# Patient Record
Sex: MALE | Race: WHITE | NOT HISPANIC OR LATINO | Employment: OTHER | URBAN - METROPOLITAN AREA
[De-identification: names, ages, dates, MRNs, and addresses within clinical notes are randomized per-mention and may not be internally consistent; named-entity substitution may affect disease eponyms.]

---

## 2017-06-20 ENCOUNTER — OFFICE VISIT (OUTPATIENT)
Dept: FAMILY MEDICINE CLINIC | Facility: CLINIC | Age: 67
End: 2017-06-20

## 2017-06-20 VITALS
SYSTOLIC BLOOD PRESSURE: 132 MMHG | OXYGEN SATURATION: 98 % | TEMPERATURE: 97.6 F | BODY MASS INDEX: 28.17 KG/M2 | WEIGHT: 208 LBS | HEIGHT: 72 IN | HEART RATE: 68 BPM | DIASTOLIC BLOOD PRESSURE: 80 MMHG

## 2017-06-20 DIAGNOSIS — I10 HTN (HYPERTENSION), BENIGN: Primary | ICD-10-CM

## 2017-06-20 DIAGNOSIS — N40.1 BPH (BENIGN PROSTATIC HYPERTROPHY) WITH URINARY OBSTRUCTION: ICD-10-CM

## 2017-06-20 DIAGNOSIS — E55.9 VITAMIN D DEFICIENCY: ICD-10-CM

## 2017-06-20 DIAGNOSIS — E78.5 HYPERLIPIDEMIA, UNSPECIFIED HYPERLIPIDEMIA TYPE: ICD-10-CM

## 2017-06-20 DIAGNOSIS — N13.8 BPH (BENIGN PROSTATIC HYPERTROPHY) WITH URINARY OBSTRUCTION: ICD-10-CM

## 2017-06-20 DIAGNOSIS — F51.04 PSYCHOPHYSIOLOGICAL INSOMNIA: ICD-10-CM

## 2017-06-20 DIAGNOSIS — F41.9 ANXIETY: ICD-10-CM

## 2017-06-20 PROCEDURE — 99213 OFFICE O/P EST LOW 20 MIN: CPT | Performed by: FAMILY MEDICINE

## 2017-06-20 RX ORDER — TADALAFIL 5 MG/1
5 TABLET ORAL DAILY PRN
Qty: 90 TABLET | Refills: 1 | Status: SHIPPED | OUTPATIENT
Start: 2017-06-20 | End: 2018-12-13

## 2017-06-20 RX ORDER — CANDESARTAN 32 MG/1
32 TABLET ORAL DAILY
Qty: 30 TABLET | Refills: 2 | Status: SHIPPED | OUTPATIENT
Start: 2017-06-20 | End: 2017-06-20 | Stop reason: SDUPTHER

## 2017-06-20 RX ORDER — ALPRAZOLAM 0.5 MG/1
TABLET ORAL
Qty: 30 TABLET | Refills: 0 | Status: SHIPPED | OUTPATIENT
Start: 2017-06-20 | End: 2017-09-18 | Stop reason: SDUPTHER

## 2017-06-20 RX ORDER — CANDESARTAN 32 MG/1
32 TABLET ORAL DAILY
Qty: 30 TABLET | Refills: 2 | Status: SHIPPED | OUTPATIENT
Start: 2017-06-20 | End: 2017-07-16 | Stop reason: SDUPTHER

## 2017-06-21 NOTE — PATIENT INSTRUCTIONS
Belsomra will be initiated at 15 mg daily at bedtime on an as-needed basis for sleep.  The number of 10 will be given with a free coupon which will allow him to receive another 10 of 20 mg if so needed.  Other medications will be continued without alteration.  Follow-up is anticipated in 6 months preceded by fasting laboratory studies.

## 2017-07-03 RX ORDER — TEMAZEPAM 30 MG/1
30 CAPSULE ORAL NIGHTLY PRN
Qty: 30 CAPSULE | Refills: 1 | Status: SHIPPED | OUTPATIENT
Start: 2017-07-03 | End: 2018-05-05

## 2017-07-17 RX ORDER — CANDESARTAN 32 MG/1
TABLET ORAL
Qty: 30 TABLET | Refills: 0 | Status: SHIPPED | OUTPATIENT
Start: 2017-07-17 | End: 2017-10-07 | Stop reason: SDUPTHER

## 2017-09-19 RX ORDER — ALPRAZOLAM 0.5 MG/1
TABLET ORAL
Qty: 30 TABLET | Refills: 2 | Status: SHIPPED | OUTPATIENT
Start: 2017-09-19 | End: 2018-08-17 | Stop reason: SDUPTHER

## 2017-10-09 RX ORDER — CANDESARTAN 32 MG/1
TABLET ORAL
Qty: 30 TABLET | Refills: 2 | Status: SHIPPED | OUTPATIENT
Start: 2017-10-09 | End: 2018-05-05 | Stop reason: SDUPTHER

## 2018-04-19 RX ORDER — ALPRAZOLAM 0.5 MG/1
TABLET ORAL
Qty: 30 TABLET | Refills: 2 | OUTPATIENT
Start: 2018-04-19

## 2018-05-02 ENCOUNTER — OFFICE VISIT (OUTPATIENT)
Dept: FAMILY MEDICINE CLINIC | Facility: CLINIC | Age: 68
End: 2018-05-02

## 2018-05-02 VITALS
RESPIRATION RATE: 16 BRPM | HEART RATE: 92 BPM | WEIGHT: 207.9 LBS | DIASTOLIC BLOOD PRESSURE: 74 MMHG | OXYGEN SATURATION: 94 % | SYSTOLIC BLOOD PRESSURE: 118 MMHG | TEMPERATURE: 98.2 F | HEIGHT: 72 IN | BODY MASS INDEX: 28.16 KG/M2

## 2018-05-02 DIAGNOSIS — E78.2 MIXED HYPERLIPIDEMIA: ICD-10-CM

## 2018-05-02 DIAGNOSIS — F51.04 PSYCHOPHYSIOLOGICAL INSOMNIA: ICD-10-CM

## 2018-05-02 DIAGNOSIS — I10 HTN (HYPERTENSION), BENIGN: Primary | ICD-10-CM

## 2018-05-02 DIAGNOSIS — N40.1 BENIGN PROSTATIC HYPERPLASIA WITH URINARY OBSTRUCTION: ICD-10-CM

## 2018-05-02 DIAGNOSIS — E55.9 VITAMIN D DEFICIENCY: ICD-10-CM

## 2018-05-02 DIAGNOSIS — F41.9 ANXIETY: ICD-10-CM

## 2018-05-02 DIAGNOSIS — N13.8 BENIGN PROSTATIC HYPERPLASIA WITH URINARY OBSTRUCTION: ICD-10-CM

## 2018-05-02 PROCEDURE — 99213 OFFICE O/P EST LOW 20 MIN: CPT | Performed by: FAMILY MEDICINE

## 2018-05-05 RX ORDER — CANDESARTAN 32 MG/1
32 TABLET ORAL DAILY
Qty: 90 TABLET | Refills: 0 | Status: SHIPPED | OUTPATIENT
Start: 2018-05-05 | End: 2018-05-07 | Stop reason: SDUPTHER

## 2018-05-05 NOTE — PROGRESS NOTES
Subjective   Jay Jay Schreiber is a 67 y.o. male with   Chief Complaint   Patient presents with   • Med Refill   .    History of Present Illness   67-year-old white male with history of hypertension using candesartan at 32 mg-half tab daily.  This has controlled his blood pressure well for years and he will need refill of same.  Patient continues to use alprazolam using anywhere from 1-1-1/2 daily-usually at night near bedtime.  He no longer uses temazepam and at one time had used Ambien but found himself sleepwalking.  Last labs have been quite some time ago and patient is currently not fasting.  He is tolerating the above medication and there are no acute complaints.  The following portions of the patient's history were reviewed and updated as appropriate: allergies, current medications, past family history, past medical history, past social history, past surgical history and problem list.    Review of Systems   Cardiovascular: Negative for chest pain, palpitations and leg swelling.        Essential hypertension   Psychiatric/Behavioral: Positive for sleep disturbance. Negative for dysphoric mood. The patient is nervous/anxious.        Objective     Vitals:    05/02/18 1605   BP: 118/74   Pulse: 92   Resp: 16   Temp: 98.2 °F (36.8 °C)   SpO2: 94%       No results found for this or any previous visit (from the past 168 hour(s)).    Physical Exam   Constitutional: He is oriented to person, place, and time. He appears well-developed and well-nourished.   HENT:   Head: Normocephalic and atraumatic.   Neck: Trachea normal and phonation normal. Neck supple. Normal carotid pulses present. Carotid bruit is not present. No thyroid mass and no thyromegaly present.   Cardiovascular: Normal rate, regular rhythm and normal heart sounds.  Exam reveals no gallop and no friction rub.    No murmur heard.  Pulmonary/Chest: Effort normal and breath sounds normal. No respiratory distress. He has no decreased breath sounds. He has no  wheezes. He has no rhonchi. He has no rales.   Lymphadenopathy:     He has no cervical adenopathy.   Neurological: He is alert and oriented to person, place, and time.   Skin: Skin is warm and dry. No rash noted.   Psychiatric: He has a normal mood and affect. His speech is normal and behavior is normal. Judgment and thought content normal. Cognition and memory are normal.   Nursing note and vitals reviewed.      Assessment/Plan   Jay Jay was seen today for med refill.    Diagnoses and all orders for this visit:    HTN (hypertension), benign  -     CBC & Differential; Future  -     Comprehensive Metabolic Panel; Future    Mixed hyperlipidemia  -     CBC & Differential; Future  -     Comprehensive Metabolic Panel; Future  -     Lipid Panel; Future    Vitamin D deficiency  -     CBC & Differential; Future  -     Comprehensive Metabolic Panel; Future  -     Vitamin D 25 Hydroxy; Future    Anxiety  -     CBC & Differential; Future  -     Comprehensive Metabolic Panel; Future    Psychophysiological insomnia  -     CBC & Differential; Future  -     Comprehensive Metabolic Panel; Future    BPH (benign prostatic hypertrophy) with urinary obstruction  -     CBC & Differential; Future  -     Comprehensive Metabolic Panel; Future  -     PSA DIAGNOSTIC; Future    Other orders  -     candesartan (ATACAND) 32 MG tablet; Take 1 tablet by mouth Daily.        Return in about 6 months (around 11/2/2018) for Recheck.

## 2018-05-07 RX ORDER — CANDESARTAN 32 MG/1
32 TABLET ORAL DAILY
Qty: 90 TABLET | Refills: 1 | Status: SHIPPED | OUTPATIENT
Start: 2018-05-07 | End: 2018-06-27 | Stop reason: SDUPTHER

## 2018-05-07 RX ORDER — CANDESARTAN 32 MG/1
TABLET ORAL
Qty: 30 TABLET | Refills: 2 | Status: SHIPPED | OUTPATIENT
Start: 2018-05-07 | End: 2018-05-07 | Stop reason: SDUPTHER

## 2018-06-27 RX ORDER — CANDESARTAN 32 MG/1
32 TABLET ORAL DAILY
Qty: 30 TABLET | Refills: 6 | Status: SHIPPED | OUTPATIENT
Start: 2018-06-27 | End: 2018-08-23 | Stop reason: SDUPTHER

## 2018-08-20 RX ORDER — ALPRAZOLAM 0.5 MG/1
TABLET ORAL
Qty: 30 TABLET | Refills: 0 | Status: SHIPPED | OUTPATIENT
Start: 2018-08-20 | End: 2018-11-21 | Stop reason: SDUPTHER

## 2018-08-23 RX ORDER — CANDESARTAN 32 MG/1
32 TABLET ORAL DAILY
Qty: 90 TABLET | Refills: 1 | Status: SHIPPED | OUTPATIENT
Start: 2018-08-23 | End: 2019-08-30 | Stop reason: SDUPTHER

## 2018-10-15 DIAGNOSIS — N13.8 BENIGN PROSTATIC HYPERPLASIA WITH URINARY OBSTRUCTION: ICD-10-CM

## 2018-10-15 DIAGNOSIS — F51.04 PSYCHOPHYSIOLOGICAL INSOMNIA: ICD-10-CM

## 2018-10-15 DIAGNOSIS — F41.9 ANXIETY: ICD-10-CM

## 2018-10-15 DIAGNOSIS — I10 HTN (HYPERTENSION), BENIGN: ICD-10-CM

## 2018-10-15 DIAGNOSIS — E55.9 VITAMIN D DEFICIENCY: ICD-10-CM

## 2018-10-15 DIAGNOSIS — E78.2 MIXED HYPERLIPIDEMIA: ICD-10-CM

## 2018-10-15 DIAGNOSIS — N40.1 BENIGN PROSTATIC HYPERPLASIA WITH URINARY OBSTRUCTION: ICD-10-CM

## 2018-11-21 RX ORDER — ALPRAZOLAM 0.5 MG/1
TABLET ORAL
Qty: 30 TABLET | Refills: 0 | Status: SHIPPED | OUTPATIENT
Start: 2018-11-21 | End: 2019-03-04 | Stop reason: SDUPTHER

## 2018-12-06 LAB
25(OH)D3+25(OH)D2 SERPL-MCNC: 38.4 NG/ML
ALBUMIN SERPL-MCNC: 4.8 G/DL (ref 3.5–5.2)
ALBUMIN/GLOB SERPL: 2.2 G/DL
ALP SERPL-CCNC: 59 U/L (ref 40–129)
ALT SERPL-CCNC: 24 U/L (ref 5–41)
AST SERPL-CCNC: 19 U/L (ref 5–40)
BASOPHILS # BLD AUTO: 0.02 10*3/MM3 (ref 0–0.2)
BASOPHILS NFR BLD AUTO: 0.4 % (ref 0–2)
BILIRUB SERPL-MCNC: 0.6 MG/DL (ref 0.2–1.2)
BUN SERPL-MCNC: 12 MG/DL (ref 8–23)
BUN/CREAT SERPL: 14 (ref 7–25)
CALCIUM SERPL-MCNC: 9.3 MG/DL (ref 8.8–10.5)
CHLORIDE SERPL-SCNC: 101 MMOL/L (ref 98–107)
CHOLEST SERPL-MCNC: 264 MG/DL (ref 0–200)
CO2 SERPL-SCNC: 25.1 MMOL/L (ref 22–29)
CREAT SERPL-MCNC: 0.86 MG/DL (ref 0.76–1.27)
EOSINOPHIL # BLD AUTO: 0.17 10*3/MM3 (ref 0.1–0.3)
EOSINOPHIL NFR BLD AUTO: 3.7 % (ref 0–4)
ERYTHROCYTE [DISTWIDTH] IN BLOOD BY AUTOMATED COUNT: 13.1 % (ref 11.5–14.5)
GLOBULIN SER CALC-MCNC: 2.2 GM/DL
GLUCOSE SERPL-MCNC: 95 MG/DL (ref 65–99)
HCT VFR BLD AUTO: 45.7 % (ref 42–52)
HDLC SERPL-MCNC: 43 MG/DL (ref 40–60)
HGB BLD-MCNC: 15 G/DL (ref 14–18)
IMM GRANULOCYTES # BLD: 0.01 10*3/MM3 (ref 0–0.03)
IMM GRANULOCYTES NFR BLD: 0.2 % (ref 0–0.5)
LDLC SERPL CALC-MCNC: 188 MG/DL (ref 0–100)
LYMPHOCYTES # BLD AUTO: 1.2 10*3/MM3 (ref 0.6–4.8)
LYMPHOCYTES NFR BLD AUTO: 26.4 % (ref 20–45)
MCH RBC QN AUTO: 30.9 PG (ref 27–31)
MCHC RBC AUTO-ENTMCNC: 32.8 G/DL (ref 31–37)
MCV RBC AUTO: 94.2 FL (ref 80–94)
MONOCYTES # BLD AUTO: 0.46 10*3/MM3 (ref 0–1)
MONOCYTES NFR BLD AUTO: 10.1 % (ref 3–8)
NEUTROPHILS # BLD AUTO: 2.68 10*3/MM3 (ref 1.5–8.3)
NEUTROPHILS NFR BLD AUTO: 59.2 % (ref 45–70)
NRBC BLD AUTO-RTO: 0 /100 WBC (ref 0–0)
PLATELET # BLD AUTO: 227 10*3/MM3 (ref 140–500)
POTASSIUM SERPL-SCNC: 4.5 MMOL/L (ref 3.5–5.2)
PROT SERPL-MCNC: 7 G/DL (ref 6–8.5)
PSA SERPL-MCNC: 3.22 NG/ML (ref 0–4)
RBC # BLD AUTO: 4.85 10*6/MM3 (ref 4.7–6.1)
SODIUM SERPL-SCNC: 139 MMOL/L (ref 136–145)
TRIGL SERPL-MCNC: 167 MG/DL (ref 0–150)
VLDLC SERPL CALC-MCNC: 33.4 MG/DL (ref 8–32)
WBC # BLD AUTO: 4.54 10*3/MM3 (ref 4.8–10.8)

## 2018-12-13 ENCOUNTER — OFFICE VISIT (OUTPATIENT)
Dept: FAMILY MEDICINE CLINIC | Facility: CLINIC | Age: 68
End: 2018-12-13

## 2018-12-13 VITALS
TEMPERATURE: 98.2 F | BODY MASS INDEX: 27.63 KG/M2 | WEIGHT: 204 LBS | DIASTOLIC BLOOD PRESSURE: 80 MMHG | HEIGHT: 72 IN | RESPIRATION RATE: 16 BRPM | SYSTOLIC BLOOD PRESSURE: 134 MMHG | OXYGEN SATURATION: 99 % | HEART RATE: 78 BPM

## 2018-12-13 DIAGNOSIS — E78.2 MIXED HYPERLIPIDEMIA: ICD-10-CM

## 2018-12-13 DIAGNOSIS — F41.9 ANXIETY: ICD-10-CM

## 2018-12-13 DIAGNOSIS — E55.9 VITAMIN D DEFICIENCY: ICD-10-CM

## 2018-12-13 DIAGNOSIS — F51.04 PSYCHOPHYSIOLOGICAL INSOMNIA: ICD-10-CM

## 2018-12-13 DIAGNOSIS — I10 HTN (HYPERTENSION), BENIGN: Primary | ICD-10-CM

## 2018-12-13 PROCEDURE — 99213 OFFICE O/P EST LOW 20 MIN: CPT | Performed by: FAMILY MEDICINE

## 2018-12-13 NOTE — PATIENT INSTRUCTIONS
Orders Only on 10/15/2018   Component Date Value Ref Range Status   • WBC 12/06/2018 4.54* 4.80 - 10.80 10*3/mm3 Final   • RBC 12/06/2018 4.85  4.70 - 6.10 10*6/mm3 Final   • Hemoglobin 12/06/2018 15.0  14.0 - 18.0 g/dL Final   • Hematocrit 12/06/2018 45.7  42.0 - 52.0 % Final   • MCV 12/06/2018 94.2* 80.0 - 94.0 fL Final   • MCH 12/06/2018 30.9  27.0 - 31.0 pg Final   • MCHC 12/06/2018 32.8  31.0 - 37.0 g/dL Final   • RDW 12/06/2018 13.1  11.5 - 14.5 % Final   • Platelets 12/06/2018 227  140 - 500 10*3/mm3 Final   • Neutrophil Rel % 12/06/2018 59.2  45.0 - 70.0 % Final   • Lymphocyte Rel % 12/06/2018 26.4  20.0 - 45.0 % Final   • Monocyte Rel % 12/06/2018 10.1* 3.0 - 8.0 % Final   • Eosinophil Rel % 12/06/2018 3.7  0.0 - 4.0 % Final   • Basophil Rel % 12/06/2018 0.4  0.0 - 2.0 % Final   • Neutrophils Absolute 12/06/2018 2.68  1.50 - 8.30 10*3/mm3 Final   • Lymphocytes Absolute 12/06/2018 1.20  0.60 - 4.80 10*3/mm3 Final   • Monocytes Absolute 12/06/2018 0.46  0.00 - 1.00 10*3/mm3 Final   • Eosinophils Absolute 12/06/2018 0.17  0.10 - 0.30 10*3/mm3 Final   • Basophils Absolute 12/06/2018 0.02  0.00 - 0.20 10*3/mm3 Final   • Immature Granulocyte Rel % 12/06/2018 0.2  0.0 - 0.5 % Final   • Immature Grans Absolute 12/06/2018 0.01  0.00 - 0.03 10*3/mm3 Final   • nRBC 12/06/2018 0.0  0.0 - 0.0 /100 WBC Final   • Glucose 12/06/2018 95  65 - 99 mg/dL Final   • BUN 12/06/2018 12  8 - 23 mg/dL Final   • Creatinine 12/06/2018 0.86  0.76 - 1.27 mg/dL Final   • eGFR Non  Am 12/06/2018 88  >60 mL/min/1.73 Final   • eGFR African Am 12/06/2018 107  >60 mL/min/1.73 Final   • BUN/Creatinine Ratio 12/06/2018 14.0  7.0 - 25.0 Final   • Sodium 12/06/2018 139  136 - 145 mmol/L Final   • Potassium 12/06/2018 4.5  3.5 - 5.2 mmol/L Final   • Chloride 12/06/2018 101  98 - 107 mmol/L Final   • Total CO2 12/06/2018 25.1  22.0 - 29.0 mmol/L Final   • Calcium 12/06/2018 9.3  8.8 - 10.5 mg/dL Final   • Total Protein 12/06/2018 7.0  6.0 -  "8.5 g/dL Final   • Albumin 12/06/2018 4.80  3.50 - 5.20 g/dL Final   • Globulin 12/06/2018 2.2  gm/dL Final   • A/G Ratio 12/06/2018 2.2  g/dL Final   • Total Bilirubin 12/06/2018 0.6  0.2 - 1.2 mg/dL Final   • Alkaline Phosphatase 12/06/2018 59  40 - 129 U/L Final   • AST (SGOT) 12/06/2018 19  5 - 40 U/L Final   • ALT (SGPT) 12/06/2018 24  5 - 41 U/L Final   • Total Cholesterol 12/06/2018 264* 0 - 200 mg/dL Final   • Triglycerides 12/06/2018 167* 0 - 150 mg/dL Final   • HDL Cholesterol 12/06/2018 43  40 - 60 mg/dL Final   • VLDL Cholesterol 12/06/2018 33.4* 8 - 32 mg/dL Final   • LDL Cholesterol  12/06/2018 188* 0 - 100 mg/dL Final   • PSA 12/06/2018 3.220  0.000 - 4.000 ng/mL Final    Comment: The total PSA (tPSA) method performed at [facility] is a  quantitative electrochemiluminescence immunoassay 'ECLIA'     • 25 Hydroxy, Vitamin D 12/06/2018 38.4  ng/ml Final    Comment: Reference Range for Total Vitamin D 25(OH)  Deficiency <20.0 ng/mL  Insufficiency 21-29 ng/mL  Sufficiency  ng/mL  Toxicity >100 ng/mL         Slo-Release Niacin taking 500 mg with Aspirin 325 mg for 1 month.  Then 1000 mg of Slo-Release Niacin with Apirin 325 mg for 1 month then take 1500 mg of Slo-Release Niacin with the Aspirin 325 mg for 1 month.  Then return for a follow up on your Cholesterol.  At this point you can start taking the Niacin at any point during the day and may stop the Aspirin.  If you should have flushing you can go back to taking the Aspirin with it.  Do not use regular Niacin or the \"no flush\" Niacin.  Consider starting Vitamin D3 5000 IU daily.  "

## 2018-12-13 NOTE — PROGRESS NOTES
Subjective   Jay Jay Schreiber is a 68 y.o. male with   Chief Complaint   Patient presents with   • Follow-up     on labs   • Hypertension   .    History of Present Illness     67 yo white male who presents for follow up on stable HTN, unstable HLD, stable Vit D Def.  He does complain of some slight issues with urination, just that it is a little more difficult.  He is currently prescribed Atacand 32 mg, taking a 1/2 tablet daily for his HTN and he tolerates it well.  He also has a history of Anxiety and he is prescribed Xanax 0.5 mg that he takes once daily, typically at bedtime and it works well for him.  He has tried Temazepam and Ambien in the past.  But he had problems with those.  Pt states he is doing well at this time and he is without acute complaint.    The following portions of the patient's history were reviewed and updated as appropriate: allergies, current medications, past family history, past medical history, past social history, past surgical history and problem list.    Review of Systems   Cardiovascular: Negative for chest pain, palpitations and leg swelling.        HTN  HLD   Endocrine: Negative for cold intolerance and heat intolerance.        Vit D Def   Psychiatric/Behavioral: Positive for sleep disturbance. The patient is nervous/anxious.    All other systems reviewed and are negative.      Objective     Vitals:    12/13/18 1431   BP: 134/80   Pulse: 78   Resp: 16   Temp: 98.2 °F (36.8 °C)   SpO2: 99%     BP Readings from Last 3 Encounters:   12/13/18 134/80   05/02/18 118/74   06/20/17 132/80      Wt Readings from Last 3 Encounters:   12/13/18 92.5 kg (204 lb)   05/02/18 94.3 kg (207 lb 14.4 oz)   06/20/17 94.3 kg (208 lb)        No visits with results within 2 Week(s) from this visit.   Latest known visit with results is:   Orders Only on 10/15/2018   Component Date Value Ref Range Status   • WBC 12/06/2018 4.54* 4.80 - 10.80 10*3/mm3 Final   • RBC 12/06/2018 4.85  4.70 - 6.10 10*6/mm3 Final    • Hemoglobin 12/06/2018 15.0  14.0 - 18.0 g/dL Final   • Hematocrit 12/06/2018 45.7  42.0 - 52.0 % Final   • MCV 12/06/2018 94.2* 80.0 - 94.0 fL Final   • MCH 12/06/2018 30.9  27.0 - 31.0 pg Final   • MCHC 12/06/2018 32.8  31.0 - 37.0 g/dL Final   • RDW 12/06/2018 13.1  11.5 - 14.5 % Final   • Platelets 12/06/2018 227  140 - 500 10*3/mm3 Final   • Neutrophil Rel % 12/06/2018 59.2  45.0 - 70.0 % Final   • Lymphocyte Rel % 12/06/2018 26.4  20.0 - 45.0 % Final   • Monocyte Rel % 12/06/2018 10.1* 3.0 - 8.0 % Final   • Eosinophil Rel % 12/06/2018 3.7  0.0 - 4.0 % Final   • Basophil Rel % 12/06/2018 0.4  0.0 - 2.0 % Final   • Neutrophils Absolute 12/06/2018 2.68  1.50 - 8.30 10*3/mm3 Final   • Lymphocytes Absolute 12/06/2018 1.20  0.60 - 4.80 10*3/mm3 Final   • Monocytes Absolute 12/06/2018 0.46  0.00 - 1.00 10*3/mm3 Final   • Eosinophils Absolute 12/06/2018 0.17  0.10 - 0.30 10*3/mm3 Final   • Basophils Absolute 12/06/2018 0.02  0.00 - 0.20 10*3/mm3 Final   • Immature Granulocyte Rel % 12/06/2018 0.2  0.0 - 0.5 % Final   • Immature Grans Absolute 12/06/2018 0.01  0.00 - 0.03 10*3/mm3 Final   • nRBC 12/06/2018 0.0  0.0 - 0.0 /100 WBC Final   • Glucose 12/06/2018 95  65 - 99 mg/dL Final   • BUN 12/06/2018 12  8 - 23 mg/dL Final   • Creatinine 12/06/2018 0.86  0.76 - 1.27 mg/dL Final   • eGFR Non  Am 12/06/2018 88  >60 mL/min/1.73 Final   • eGFR African Am 12/06/2018 107  >60 mL/min/1.73 Final   • BUN/Creatinine Ratio 12/06/2018 14.0  7.0 - 25.0 Final   • Sodium 12/06/2018 139  136 - 145 mmol/L Final   • Potassium 12/06/2018 4.5  3.5 - 5.2 mmol/L Final   • Chloride 12/06/2018 101  98 - 107 mmol/L Final   • Total CO2 12/06/2018 25.1  22.0 - 29.0 mmol/L Final   • Calcium 12/06/2018 9.3  8.8 - 10.5 mg/dL Final   • Total Protein 12/06/2018 7.0  6.0 - 8.5 g/dL Final   • Albumin 12/06/2018 4.80  3.50 - 5.20 g/dL Final   • Globulin 12/06/2018 2.2  gm/dL Final   • A/G Ratio 12/06/2018 2.2  g/dL Final   • Total Bilirubin  12/06/2018 0.6  0.2 - 1.2 mg/dL Final   • Alkaline Phosphatase 12/06/2018 59  40 - 129 U/L Final   • AST (SGOT) 12/06/2018 19  5 - 40 U/L Final   • ALT (SGPT) 12/06/2018 24  5 - 41 U/L Final   • Total Cholesterol 12/06/2018 264* 0 - 200 mg/dL Final   • Triglycerides 12/06/2018 167* 0 - 150 mg/dL Final   • HDL Cholesterol 12/06/2018 43  40 - 60 mg/dL Final   • VLDL Cholesterol 12/06/2018 33.4* 8 - 32 mg/dL Final   • LDL Cholesterol  12/06/2018 188* 0 - 100 mg/dL Final   • PSA 12/06/2018 3.220  0.000 - 4.000 ng/mL Final    Comment: The total PSA (tPSA) method performed at [facility] is a  quantitative electrochemiluminescence immunoassay 'ECLIA'     • 25 Hydroxy, Vitamin D 12/06/2018 38.4  ng/ml Final    Comment: Reference Range for Total Vitamin D 25(OH)  Deficiency <20.0 ng/mL  Insufficiency 21-29 ng/mL  Sufficiency  ng/mL  Toxicity >100 ng/mL           Physical Exam   Constitutional: He is oriented to person, place, and time. He appears well-developed and well-nourished.   HENT:   Head: Normocephalic and atraumatic.   Neck: Trachea normal and phonation normal. Neck supple. Normal carotid pulses present. Carotid bruit is not present. No thyroid mass and no thyromegaly present.   Cardiovascular: Normal rate, regular rhythm and normal heart sounds. Exam reveals no gallop and no friction rub.   No murmur heard.  Pulmonary/Chest: Effort normal and breath sounds normal. No respiratory distress. He has no decreased breath sounds. He has no wheezes. He has no rhonchi. He has no rales.   Lymphadenopathy:     He has no cervical adenopathy.   Neurological: He is alert and oriented to person, place, and time.   Skin: Skin is warm and dry. No rash noted.   Psychiatric: He has a normal mood and affect. His speech is normal and behavior is normal. Judgment and thought content normal. Cognition and memory are normal.   Nursing note and vitals reviewed.      Assessment/Plan   Jay Jay was seen today for follow-up and  hypertension.    Diagnoses and all orders for this visit:    HTN (hypertension), benign    Mixed hyperlipidemia  -     CBC & Differential; Future  -     Comprehensive Metabolic Panel; Future  -     Lipid Panel; Future    Vitamin D deficiency  -     Vitamin D 25 Hydroxy; Future    Anxiety    Psychophysiological insomnia      Patient Instructions     Orders Only on 10/15/2018   Component Date Value Ref Range Status   • WBC 12/06/2018 4.54* 4.80 - 10.80 10*3/mm3 Final   • RBC 12/06/2018 4.85  4.70 - 6.10 10*6/mm3 Final   • Hemoglobin 12/06/2018 15.0  14.0 - 18.0 g/dL Final   • Hematocrit 12/06/2018 45.7  42.0 - 52.0 % Final   • MCV 12/06/2018 94.2* 80.0 - 94.0 fL Final   • MCH 12/06/2018 30.9  27.0 - 31.0 pg Final   • MCHC 12/06/2018 32.8  31.0 - 37.0 g/dL Final   • RDW 12/06/2018 13.1  11.5 - 14.5 % Final   • Platelets 12/06/2018 227  140 - 500 10*3/mm3 Final   • Neutrophil Rel % 12/06/2018 59.2  45.0 - 70.0 % Final   • Lymphocyte Rel % 12/06/2018 26.4  20.0 - 45.0 % Final   • Monocyte Rel % 12/06/2018 10.1* 3.0 - 8.0 % Final   • Eosinophil Rel % 12/06/2018 3.7  0.0 - 4.0 % Final   • Basophil Rel % 12/06/2018 0.4  0.0 - 2.0 % Final   • Neutrophils Absolute 12/06/2018 2.68  1.50 - 8.30 10*3/mm3 Final   • Lymphocytes Absolute 12/06/2018 1.20  0.60 - 4.80 10*3/mm3 Final   • Monocytes Absolute 12/06/2018 0.46  0.00 - 1.00 10*3/mm3 Final   • Eosinophils Absolute 12/06/2018 0.17  0.10 - 0.30 10*3/mm3 Final   • Basophils Absolute 12/06/2018 0.02  0.00 - 0.20 10*3/mm3 Final   • Immature Granulocyte Rel % 12/06/2018 0.2  0.0 - 0.5 % Final   • Immature Grans Absolute 12/06/2018 0.01  0.00 - 0.03 10*3/mm3 Final   • nRBC 12/06/2018 0.0  0.0 - 0.0 /100 WBC Final   • Glucose 12/06/2018 95  65 - 99 mg/dL Final   • BUN 12/06/2018 12  8 - 23 mg/dL Final   • Creatinine 12/06/2018 0.86  0.76 - 1.27 mg/dL Final   • eGFR Non  Am 12/06/2018 88  >60 mL/min/1.73 Final   • eGFR African Am 12/06/2018 107  >60 mL/min/1.73 Final   •  "BUN/Creatinine Ratio 12/06/2018 14.0  7.0 - 25.0 Final   • Sodium 12/06/2018 139  136 - 145 mmol/L Final   • Potassium 12/06/2018 4.5  3.5 - 5.2 mmol/L Final   • Chloride 12/06/2018 101  98 - 107 mmol/L Final   • Total CO2 12/06/2018 25.1  22.0 - 29.0 mmol/L Final   • Calcium 12/06/2018 9.3  8.8 - 10.5 mg/dL Final   • Total Protein 12/06/2018 7.0  6.0 - 8.5 g/dL Final   • Albumin 12/06/2018 4.80  3.50 - 5.20 g/dL Final   • Globulin 12/06/2018 2.2  gm/dL Final   • A/G Ratio 12/06/2018 2.2  g/dL Final   • Total Bilirubin 12/06/2018 0.6  0.2 - 1.2 mg/dL Final   • Alkaline Phosphatase 12/06/2018 59  40 - 129 U/L Final   • AST (SGOT) 12/06/2018 19  5 - 40 U/L Final   • ALT (SGPT) 12/06/2018 24  5 - 41 U/L Final   • Total Cholesterol 12/06/2018 264* 0 - 200 mg/dL Final   • Triglycerides 12/06/2018 167* 0 - 150 mg/dL Final   • HDL Cholesterol 12/06/2018 43  40 - 60 mg/dL Final   • VLDL Cholesterol 12/06/2018 33.4* 8 - 32 mg/dL Final   • LDL Cholesterol  12/06/2018 188* 0 - 100 mg/dL Final   • PSA 12/06/2018 3.220  0.000 - 4.000 ng/mL Final    Comment: The total PSA (tPSA) method performed at [facility] is a  quantitative electrochemiluminescence immunoassay 'ECLIA'     • 25 Hydroxy, Vitamin D 12/06/2018 38.4  ng/ml Final    Comment: Reference Range for Total Vitamin D 25(OH)  Deficiency <20.0 ng/mL  Insufficiency 21-29 ng/mL  Sufficiency  ng/mL  Toxicity >100 ng/mL         Slo-Release Niacin taking 500 mg with Aspirin 325 mg for 1 month.  Then 1000 mg of Slo-Release Niacin with Apirin 325 mg for 1 month then take 1500 mg of Slo-Release Niacin with the Aspirin 325 mg for 1 month.  Then return for a follow up on your Cholesterol.  At this point you can start taking the Niacin at any point during the day and may stop the Aspirin.  If you should have flushing you can go back to taking the Aspirin with it.  Do not use regular Niacin or the \"no flush\" Niacin.  Consider starting Vitamin D3 5000 IU daily.      Return in about 6 " months (around 6/13/2019).    Scribed for Cheng Duran MD by Blanca Duran CMA. 12/13/2018    I, Cheng Duran MD personally performed the services described in this documentation, as scribed by Blanca Duran CMA in my presence, and it is both accurate and complete

## 2019-01-08 ENCOUNTER — TELEPHONE (OUTPATIENT)
Dept: FAMILY MEDICINE CLINIC | Facility: CLINIC | Age: 69
End: 2019-01-08

## 2019-01-08 DIAGNOSIS — M79.645 PAIN OF LEFT THUMB: Primary | ICD-10-CM

## 2019-01-08 NOTE — TELEPHONE ENCOUNTER
"Jay Jay said he was using a gas powered \"tool\" and the wire pulled back on him and strained and hurt his thumb. He said he spoke to you about this at his last visit. The pain is not any better and he wants to know if you need to see him or can you just refer him out.  "

## 2019-01-08 NOTE — TELEPHONE ENCOUNTER
Please order a hand series and have radiology give patient films.  Then refer him to Dr. Barber of the hand service.

## 2019-01-15 ENCOUNTER — TELEPHONE (OUTPATIENT)
Dept: FAMILY MEDICINE CLINIC | Facility: CLINIC | Age: 69
End: 2019-01-15

## 2019-01-15 ENCOUNTER — HOSPITAL ENCOUNTER (OUTPATIENT)
Dept: GENERAL RADIOLOGY | Facility: HOSPITAL | Age: 69
Discharge: HOME | End: 2019-01-15
Admitting: FAMILY MEDICINE

## 2019-01-15 PROCEDURE — 73130 X-RAY EXAM OF HAND: CPT

## 2019-01-15 NOTE — TELEPHONE ENCOUNTER
Pt requesting a written rx for Cialis 20mg for 90 days. He is having it filled at a pharmacy in [place].

## 2019-01-25 RX ORDER — TADALAFIL 20 MG/1
20 TABLET ORAL DAILY PRN
Qty: 90 TABLET | Refills: 0 | Status: SHIPPED | OUTPATIENT
Start: 2019-01-25 | End: 2019-01-25 | Stop reason: SDUPTHER

## 2019-01-25 RX ORDER — TADALAFIL 20 MG/1
20 TABLET ORAL DAILY PRN
Qty: 90 TABLET | Refills: 0 | Status: SHIPPED | OUTPATIENT
Start: 2019-01-25 | End: 2019-10-21 | Stop reason: SDUPTHER

## 2019-03-04 RX ORDER — ALPRAZOLAM 0.5 MG/1
0.5 TABLET ORAL DAILY
Qty: 30 TABLET | Refills: 0 | Status: SHIPPED | OUTPATIENT
Start: 2019-03-04 | End: 2019-03-06 | Stop reason: SDUPTHER

## 2019-03-06 RX ORDER — ALPRAZOLAM 0.5 MG/1
0.5 TABLET ORAL DAILY
Qty: 30 TABLET | Refills: 0 | Status: SHIPPED | OUTPATIENT
Start: 2019-03-06 | End: 2019-06-12 | Stop reason: SDUPTHER

## 2019-03-06 NOTE — TELEPHONE ENCOUNTER
Pt needs this to be sent to [facility]   Pt is no Longer Using [facility]    We will need new agreement next visit

## 2019-06-12 DIAGNOSIS — Z51.81 MEDICATION MONITORING ENCOUNTER: ICD-10-CM

## 2019-06-12 DIAGNOSIS — F51.04 PSYCHOPHYSIOLOGICAL INSOMNIA: Primary | ICD-10-CM

## 2019-06-12 DIAGNOSIS — Z79.899 HIGH RISK MEDICATION USE: ICD-10-CM

## 2019-06-13 RX ORDER — ALPRAZOLAM 0.5 MG/1
TABLET ORAL
Qty: 30 TABLET | Refills: 0 | Status: SHIPPED | OUTPATIENT
Start: 2019-06-13 | End: 2019-12-17 | Stop reason: SDUPTHER

## 2019-06-13 NOTE — TELEPHONE ENCOUNTER
Pt states he is unable to come in to leave a urine specimen and he has had this conversation with Dr Duran before.  He understands that is all part of HB1 but he is unable to do that at this time.  He asked I give this message to Dr Duran upon his return.  But pt has an appointment on 6/25/19, and I will request he leave a specimen at that time.  I have reordered the UDS

## 2019-06-13 NOTE — TELEPHONE ENCOUNTER
LS  12/13/18  appt scheduled 6/25/19  LF  3/6/19  UDS  None  Mauricio 4/20/18 will reorder today    Ok printed xanax  UDS on arrival    Mauricio Dong MD

## 2019-06-25 ENCOUNTER — OFFICE VISIT (OUTPATIENT)
Dept: FAMILY MEDICINE CLINIC | Facility: CLINIC | Age: 69
End: 2019-06-25

## 2019-06-25 VITALS
OXYGEN SATURATION: 96 % | DIASTOLIC BLOOD PRESSURE: 78 MMHG | HEIGHT: 72 IN | BODY MASS INDEX: 28.04 KG/M2 | WEIGHT: 207 LBS | HEART RATE: 65 BPM | SYSTOLIC BLOOD PRESSURE: 120 MMHG

## 2019-06-25 DIAGNOSIS — F41.9 ANXIETY: ICD-10-CM

## 2019-06-25 DIAGNOSIS — I10 HTN (HYPERTENSION), BENIGN: Primary | ICD-10-CM

## 2019-06-25 PROCEDURE — 99213 OFFICE O/P EST LOW 20 MIN: CPT | Performed by: FAMILY MEDICINE

## 2019-06-25 NOTE — PROGRESS NOTES
Subjective   Jay Jay Schreiber is a 68 y.o. male with   Chief Complaint   Patient presents with   • Hypertension     follow up on medications   • Med Refill     needing a refill on xanax but wanting to discuss   .    History of Present Illness   68-year-old white male here for essential hypertension follow-up.  Patient is currently using Atacand at 32 mg daily and states often he uses half a pill.  Blood pressure has been well controlled and there have been no side effects with the above medication.  Patient also uses alprazolam on a as needed basis.  This is a rare event and patient gets this refilled sparingly.  He is currently doing well and has no acute complaints.  The following portions of the patient's history were reviewed and updated as appropriate: allergies, current medications, past family history, past medical history, past social history, past surgical history and problem list.    Review of Systems   Cardiovascular:        Essential hypertension   Psychiatric/Behavioral: Positive for sleep disturbance. Negative for dysphoric mood, self-injury and suicidal ideas. The patient is nervous/anxious.        Objective     Vitals:    06/25/19 0748   BP: 120/78   Pulse: 65   SpO2: 96%       No results found for this or any previous visit (from the past 168 hour(s)).    Physical Exam   Constitutional: He is oriented to person, place, and time. He appears well-developed and well-nourished.   HENT:   Head: Normocephalic and atraumatic.   Neck: Trachea normal and phonation normal. Neck supple. Normal carotid pulses present. Carotid bruit is not present. No thyroid mass and no thyromegaly present.   Cardiovascular: Normal rate, regular rhythm and normal heart sounds. Exam reveals no gallop and no friction rub.   No murmur heard.  Pulmonary/Chest: Effort normal and breath sounds normal. No respiratory distress. He has no decreased breath sounds. He has no wheezes. He has no rhonchi. He has no rales.   Lymphadenopathy:      He has no cervical adenopathy.   Neurological: He is alert and oriented to person, place, and time.   Skin: Skin is warm and dry. No rash noted.   Psychiatric: He has a normal mood and affect. His speech is normal and behavior is normal. Judgment and thought content normal. Cognition and memory are normal.   Nursing note and vitals reviewed.      Assessment/Plan   Jay Jay was seen today for hypertension and med refill.    Diagnoses and all orders for this visit:    HTN (hypertension), benign    Anxiety        Return in about 6 months (around 12/25/2019) for Recheck.

## 2019-06-29 LAB — DRUGS UR: NORMAL

## 2019-07-13 ENCOUNTER — RESULTS ENCOUNTER (OUTPATIENT)
Dept: FAMILY MEDICINE CLINIC | Facility: CLINIC | Age: 69
End: 2019-07-13

## 2019-07-13 DIAGNOSIS — E78.2 MIXED HYPERLIPIDEMIA: ICD-10-CM

## 2019-07-13 DIAGNOSIS — E55.9 VITAMIN D DEFICIENCY: ICD-10-CM

## 2019-07-24 RX ORDER — ALPRAZOLAM 0.5 MG/1
TABLET ORAL
Qty: 30 TABLET | Refills: 0 | Status: SHIPPED | OUTPATIENT
Start: 2019-07-24 | End: 2019-10-30 | Stop reason: SDUPTHER

## 2019-08-29 RX ORDER — CANDESARTAN 32 MG/1
TABLET ORAL
Qty: 30 TABLET | Refills: 5 | Status: CANCELLED | OUTPATIENT
Start: 2019-08-29

## 2019-08-30 DIAGNOSIS — I10 HTN (HYPERTENSION), BENIGN: Primary | ICD-10-CM

## 2019-08-30 RX ORDER — CANDESARTAN 32 MG/1
32 TABLET ORAL DAILY
Qty: 90 TABLET | Refills: 1 | Status: SHIPPED | OUTPATIENT
Start: 2019-08-30 | End: 2019-09-03 | Stop reason: SDUPTHER

## 2019-09-03 DIAGNOSIS — I10 HTN (HYPERTENSION), BENIGN: ICD-10-CM

## 2019-09-03 RX ORDER — CANDESARTAN 32 MG/1
TABLET ORAL
Qty: 30 TABLET | Refills: 5 | Status: SHIPPED | OUTPATIENT
Start: 2019-09-03 | End: 2019-12-17 | Stop reason: SDUPTHER

## 2019-10-21 RX ORDER — TADALAFIL 20 MG/1
20 TABLET ORAL DAILY PRN
Qty: 90 TABLET | Refills: 0 | Status: SHIPPED | OUTPATIENT
Start: 2019-10-21 | End: 2019-10-30 | Stop reason: SDUPTHER

## 2019-10-30 RX ORDER — ALPRAZOLAM 0.5 MG/1
TABLET ORAL
Qty: 30 TABLET | Refills: 0 | Status: SHIPPED | OUTPATIENT
Start: 2019-10-30 | End: 2020-01-15

## 2019-10-30 RX ORDER — TADALAFIL 20 MG/1
20 TABLET ORAL DAILY PRN
Qty: 90 TABLET | Refills: 0 | Status: SHIPPED | OUTPATIENT
Start: 2019-10-30 | End: 2020-10-07 | Stop reason: SDUPTHER

## 2019-10-30 NOTE — TELEPHONE ENCOUNTER
Pt called regarding Rx for cialis. said it was supposed to be sent to [facility] but never was. I call and cancelled the refill that was sent to [facility]     Pt stated he would rodrigue to come in a  written rx for this.

## 2019-12-10 DIAGNOSIS — Z12.5 SPECIAL SCREENING FOR MALIGNANT NEOPLASM OF PROSTATE: Primary | ICD-10-CM

## 2019-12-11 LAB
25(OH)D3+25(OH)D2 SERPL-MCNC: 27.8 NG/ML (ref 30–100)
ALBUMIN SERPL-MCNC: 4.6 G/DL (ref 3.5–5.2)
ALBUMIN/GLOB SERPL: 2.3 G/DL
ALP SERPL-CCNC: 66 U/L (ref 39–117)
ALT SERPL-CCNC: 15 U/L (ref 1–41)
AST SERPL-CCNC: 19 U/L (ref 1–40)
BASOPHILS # BLD AUTO: 0.02 10*3/MM3 (ref 0–0.2)
BASOPHILS NFR BLD AUTO: 0.5 % (ref 0–1.5)
BILIRUB SERPL-MCNC: 0.4 MG/DL (ref 0.2–1.2)
BUN SERPL-MCNC: 11 MG/DL (ref 8–23)
BUN/CREAT SERPL: 11.7 (ref 7–25)
CALCIUM SERPL-MCNC: 9 MG/DL (ref 8.6–10.5)
CHLORIDE SERPL-SCNC: 104 MMOL/L (ref 98–107)
CHOLEST SERPL-MCNC: 235 MG/DL (ref 0–200)
CO2 SERPL-SCNC: 27.4 MMOL/L (ref 22–29)
CREAT SERPL-MCNC: 0.94 MG/DL (ref 0.76–1.27)
EOSINOPHIL # BLD AUTO: 0.19 10*3/MM3 (ref 0–0.4)
EOSINOPHIL NFR BLD AUTO: 4.8 % (ref 0.3–6.2)
ERYTHROCYTE [DISTWIDTH] IN BLOOD BY AUTOMATED COUNT: 12.9 % (ref 12.3–15.4)
GLOBULIN SER CALC-MCNC: 2 GM/DL
GLUCOSE SERPL-MCNC: 89 MG/DL (ref 65–99)
HCT VFR BLD AUTO: 42.7 % (ref 37.5–51)
HDLC SERPL-MCNC: 38 MG/DL (ref 40–60)
HGB BLD-MCNC: 14.7 G/DL (ref 13–17.7)
IMM GRANULOCYTES # BLD AUTO: 0 10*3/MM3 (ref 0–0.05)
IMM GRANULOCYTES NFR BLD AUTO: 0 % (ref 0–0.5)
LDLC SERPL CALC-MCNC: 174 MG/DL (ref 0–100)
LYMPHOCYTES # BLD AUTO: 1.15 10*3/MM3 (ref 0.7–3.1)
LYMPHOCYTES NFR BLD AUTO: 29.3 % (ref 19.6–45.3)
MCH RBC QN AUTO: 31.3 PG (ref 26.6–33)
MCHC RBC AUTO-ENTMCNC: 34.4 G/DL (ref 31.5–35.7)
MCV RBC AUTO: 90.9 FL (ref 79–97)
MONOCYTES # BLD AUTO: 0.37 10*3/MM3 (ref 0.1–0.9)
MONOCYTES NFR BLD AUTO: 9.4 % (ref 5–12)
NEUTROPHILS # BLD AUTO: 2.19 10*3/MM3 (ref 1.7–7)
NEUTROPHILS NFR BLD AUTO: 56 % (ref 42.7–76)
NRBC BLD AUTO-RTO: 0 /100 WBC (ref 0–0.2)
PLATELET # BLD AUTO: 212 10*3/MM3 (ref 140–450)
POTASSIUM SERPL-SCNC: 4.4 MMOL/L (ref 3.5–5.2)
PROT SERPL-MCNC: 6.6 G/DL (ref 6–8.5)
PSA SERPL-MCNC: 3.57 NG/ML (ref 0–4)
RBC # BLD AUTO: 4.7 10*6/MM3 (ref 4.14–5.8)
SODIUM SERPL-SCNC: 143 MMOL/L (ref 136–145)
TRIGL SERPL-MCNC: 113 MG/DL (ref 0–150)
VLDLC SERPL CALC-MCNC: 22.6 MG/DL
WBC # BLD AUTO: 3.92 10*3/MM3 (ref 3.4–10.8)

## 2019-12-17 ENCOUNTER — OFFICE VISIT (OUTPATIENT)
Dept: FAMILY MEDICINE CLINIC | Facility: CLINIC | Age: 69
End: 2019-12-17

## 2019-12-17 VITALS
BODY MASS INDEX: 28.04 KG/M2 | WEIGHT: 207 LBS | HEART RATE: 71 BPM | DIASTOLIC BLOOD PRESSURE: 80 MMHG | HEIGHT: 72 IN | SYSTOLIC BLOOD PRESSURE: 124 MMHG | OXYGEN SATURATION: 93 %

## 2019-12-17 DIAGNOSIS — N40.1 BENIGN PROSTATIC HYPERPLASIA WITH URINARY OBSTRUCTION: ICD-10-CM

## 2019-12-17 DIAGNOSIS — N52.9 VASCULOGENIC ERECTILE DYSFUNCTION, UNSPECIFIED VASCULOGENIC ERECTILE DYSFUNCTION TYPE: ICD-10-CM

## 2019-12-17 DIAGNOSIS — F90.0 ATTENTION DEFICIT HYPERACTIVITY DISORDER (ADHD), PREDOMINANTLY INATTENTIVE TYPE: ICD-10-CM

## 2019-12-17 DIAGNOSIS — I10 HTN (HYPERTENSION), BENIGN: Primary | ICD-10-CM

## 2019-12-17 DIAGNOSIS — E78.2 MIXED HYPERLIPIDEMIA: ICD-10-CM

## 2019-12-17 DIAGNOSIS — E55.9 VITAMIN D DEFICIENCY: ICD-10-CM

## 2019-12-17 DIAGNOSIS — N13.8 BENIGN PROSTATIC HYPERPLASIA WITH URINARY OBSTRUCTION: ICD-10-CM

## 2019-12-17 DIAGNOSIS — Z79.899 HIGH RISK MEDICATION USE: ICD-10-CM

## 2019-12-17 LAB
BILIRUB BLD-MCNC: NEGATIVE MG/DL
CLARITY, POC: CLEAR
COLOR UR: YELLOW
GLUCOSE UR STRIP-MCNC: NEGATIVE MG/DL
KETONES UR QL: NEGATIVE
LEUKOCYTE EST, POC: NEGATIVE
NITRITE UR-MCNC: NEGATIVE MG/ML
PH UR: 7 [PH] (ref 5–8)
PROT UR STRIP-MCNC: NEGATIVE MG/DL
RBC # UR STRIP: NEGATIVE /UL
SP GR UR: 1.01 (ref 1–1.03)
UROBILINOGEN UR QL: NORMAL

## 2019-12-17 PROCEDURE — 99214 OFFICE O/P EST MOD 30 MIN: CPT | Performed by: FAMILY MEDICINE

## 2019-12-17 PROCEDURE — 81002 URINALYSIS NONAUTO W/O SCOPE: CPT | Performed by: FAMILY MEDICINE

## 2019-12-17 RX ORDER — CANDESARTAN 32 MG/1
32 TABLET ORAL DAILY
Qty: 90 TABLET | Refills: 1 | Status: SHIPPED | OUTPATIENT
Start: 2019-12-17 | End: 2020-09-10 | Stop reason: SDUPTHER

## 2019-12-17 RX ORDER — CANDESARTAN 32 MG/1
32 TABLET ORAL DAILY
Qty: 90 TABLET | Refills: 1 | Status: SHIPPED | OUTPATIENT
Start: 2019-12-17 | End: 2019-12-17 | Stop reason: SDUPTHER

## 2019-12-17 RX ORDER — DEXTROAMPHETAMINE SACCHARATE, AMPHETAMINE ASPARTATE, DEXTROAMPHETAMINE SULFATE AND AMPHETAMINE SULFATE 5; 5; 5; 5 MG/1; MG/1; MG/1; MG/1
20 TABLET ORAL DAILY
Qty: 30 TABLET | Refills: 0 | Status: SHIPPED | OUTPATIENT
Start: 2019-12-17 | End: 2020-10-29 | Stop reason: SDUPTHER

## 2019-12-19 PROBLEM — N52.9 VASCULOGENIC ERECTILE DYSFUNCTION: Status: ACTIVE | Noted: 2019-12-19

## 2019-12-19 NOTE — PROGRESS NOTES
Subjective   Jay Jay Schreiber is a 69 y.o. male with   Chief Complaint   Patient presents with   • Hypertension   .    History of Present Illness   69-year-old white male here in follow-up for essential hypertension.  Patient has been successfully using Atacand at 32 mg daily- he actually uses half a tablet daily.  This product has been used on a regular basis and is well-tolerated without side effects.  For the most part blood pressure has been well controlled.  He also complains of ongoing erectile dysfunction and is requesting refill of Cialis at 20 mg prior to intercourse.  He has tried a variety of erectile dysfunction products in the past and this seems to have worked the best with the fewest side effects.  Patient also carries a history of attention deficit hyperactivity disorder- inattentive.  It is been quite sometime since he has had his Adderall refilled but he is requesting same.  Adderall at 20 mg used every morning has been prescribed in the past.  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  He denies side effects with the above including no evidence of tachyarrhythmia, tremor, decreased appetite or insomnia.  He does state that this has helped him improve focus and helps him maintain concentration throughout his workday.  He is also able to complete task in an orderly fashion and is less easily distracted.  The following portions of the patient's history were reviewed and updated as appropriate: allergies, current medications, past family history, past medical history, past social history, past surgical history and problem list.    Review of Systems   Cardiovascular: Negative for chest pain, palpitations and leg swelling.        Hypertension   Genitourinary:        Erectile dysfunction   Psychiatric/Behavioral: Positive for decreased concentration (ADHD) and sleep disturbance. The patient is nervous/anxious.        Objective     Vitals:    12/17/19  0736   BP: 124/80   Pulse: 71   SpO2: 93%       Recent Results (from the past 672 hour(s))   CBC & Differential    Collection Time: 12/10/19  9:22 AM   Result Value Ref Range    WBC 3.92 3.40 - 10.80 10*3/mm3    RBC 4.70 4.14 - 5.80 10*6/mm3    Hemoglobin 14.7 13.0 - 17.7 g/dL    Hematocrit 42.7 37.5 - 51.0 %    MCV 90.9 79.0 - 97.0 fL    MCH 31.3 26.6 - 33.0 pg    MCHC 34.4 31.5 - 35.7 g/dL    RDW 12.9 12.3 - 15.4 %    Platelets 212 140 - 450 10*3/mm3    Neutrophil Rel % 56.0 42.7 - 76.0 %    Lymphocyte Rel % 29.3 19.6 - 45.3 %    Monocyte Rel % 9.4 5.0 - 12.0 %    Eosinophil Rel % 4.8 0.3 - 6.2 %    Basophil Rel % 0.5 0.0 - 1.5 %    Neutrophils Absolute 2.19 1.70 - 7.00 10*3/mm3    Lymphocytes Absolute 1.15 0.70 - 3.10 10*3/mm3    Monocytes Absolute 0.37 0.10 - 0.90 10*3/mm3    Eosinophils Absolute 0.19 0.00 - 0.40 10*3/mm3    Basophils Absolute 0.02 0.00 - 0.20 10*3/mm3    Immature Granulocyte Rel % 0.0 0.0 - 0.5 %    Immature Grans Absolute 0.00 0.00 - 0.05 10*3/mm3    nRBC 0.0 0.0 - 0.2 /100 WBC   Comprehensive Metabolic Panel    Collection Time: 12/10/19  9:22 AM   Result Value Ref Range    Glucose 89 65 - 99 mg/dL    BUN 11 8 - 23 mg/dL    Creatinine 0.94 0.76 - 1.27 mg/dL    eGFR Non African Am 80 >60 mL/min/1.73    eGFR African Am 96 >60 mL/min/1.73    BUN/Creatinine Ratio 11.7 7.0 - 25.0    Sodium 143 136 - 145 mmol/L    Potassium 4.4 3.5 - 5.2 mmol/L    Chloride 104 98 - 107 mmol/L    Total CO2 27.4 22.0 - 29.0 mmol/L    Calcium 9.0 8.6 - 10.5 mg/dL    Total Protein 6.6 6.0 - 8.5 g/dL    Albumin 4.60 3.50 - 5.20 g/dL    Globulin 2.0 gm/dL    A/G Ratio 2.3 g/dL    Total Bilirubin 0.4 0.2 - 1.2 mg/dL    Alkaline Phosphatase 66 39 - 117 U/L    AST (SGOT) 19 1 - 40 U/L    ALT (SGPT) 15 1 - 41 U/L   Lipid Panel    Collection Time: 12/10/19  9:22 AM   Result Value Ref Range    Total Cholesterol 235 (H) 0 - 200 mg/dL    Triglycerides 113 0 - 150 mg/dL    HDL Cholesterol 38 (L) 40 - 60 mg/dL    VLDL Cholesterol  22.6 mg/dL    LDL Cholesterol  174 (H) 0 - 100 mg/dL   Vitamin D 25 Hydroxy    Collection Time: 12/10/19  9:22 AM   Result Value Ref Range    25 Hydroxy, Vitamin D 27.8 (L) 30.0 - 100.0 ng/ml   PSA Screen    Collection Time: 12/10/19  9:22 AM   Result Value Ref Range    PSA 3.570 0.000 - 4.000 ng/mL   POC Urinalysis Dipstick    Collection Time: 12/17/19  8:33 AM   Result Value Ref Range    Color Yellow Yellow, Straw, Dark Yellow, [place]af[place]    Clarity, UA Clear Clear    Glucose, UA Negative Negative, 1000 mg/dL (3+) mg/dL    Bilirubin Negative Negative    Ketones, UA Negative Negative    Specific Gravity  1.010 1.005 - 1.030    Blood, UA Negative Negative    pH, Urine 7.0 5.0 - 8.0    Protein, POC Negative Negative mg/dL    Urobilinogen, UA Normal Normal    Leukocytes Negative Negative    Nitrite, UA Negative Negative       Physical Exam   Constitutional: He is oriented to person, place, and time. He appears well-developed and well-nourished.   HENT:   Head: Normocephalic and atraumatic.   Neck: Trachea normal and phonation normal. Neck supple. Normal carotid pulses present. Carotid bruit is not present. No thyroid mass and no thyromegaly present.   Cardiovascular: Normal rate, regular rhythm and normal heart sounds. Exam reveals no gallop and no friction rub.   No murmur heard.  Pulmonary/Chest: Effort normal and breath sounds normal. No respiratory distress. He has no decreased breath sounds. He has no wheezes. He has no rhonchi. He has no rales.   Lymphadenopathy:     He has no cervical adenopathy.   Neurological: He is alert and oriented to person, place, and time.   Skin: Skin is warm and dry. No rash noted.   Psychiatric: He has a normal mood and affect. His speech is normal and behavior is normal. Judgment and thought content normal. Cognition and memory are normal.   Nursing note and vitals reviewed.      Assessment/Plan   Jay Jay was seen today for hypertension.    Diagnoses and all orders for this visit:    HTN  (hypertension), benign  -     Discontinue: candesartan (ATACAND) 32 MG tablet; Take 1 tablet by mouth Daily.  -     candesartan (ATACAND) 32 MG tablet; Take 1 tablet by mouth Daily.    Mixed hyperlipidemia    Vitamin D deficiency    BPH (benign prostatic hypertrophy) with urinary obstruction  -     POC Urinalysis Dipstick    Attention deficit hyperactivity disorder (ADHD), predominantly inattentive type  -     amphetamine-dextroamphetamine (ADDERALL) 20 MG tablet; Take 1 tablet by mouth Daily.    Vasculogenic erectile dysfunction, unspecified vasculogenic erectile dysfunction type    High risk medication use  -     Compliance Drug Analysis, Ur - Urine, Clean Catch        Return in about 6 months (around 6/17/2020) for Recheck.

## 2019-12-22 LAB — DRUGS UR: NORMAL

## 2020-01-15 RX ORDER — ALPRAZOLAM 0.5 MG/1
TABLET ORAL
Qty: 30 TABLET | Refills: 0 | Status: SHIPPED | OUTPATIENT
Start: 2020-01-15 | End: 2020-04-02

## 2020-02-26 ENCOUNTER — TELEPHONE (OUTPATIENT)
Dept: FAMILY MEDICINE CLINIC | Facility: CLINIC | Age: 70
End: 2020-02-26

## 2020-02-26 NOTE — TELEPHONE ENCOUNTER
Pt was given Analpram HC 2.5-1% rectal cream in February 2016.  Pt states he is having another Hemorroid flare up and is requesting a refill.  Its no longer on his med list.

## 2020-03-02 ENCOUNTER — TELEPHONE (OUTPATIENT)
Dept: FAMILY MEDICINE CLINIC | Facility: CLINIC | Age: 70
End: 2020-03-02

## 2020-03-02 NOTE — TELEPHONE ENCOUNTER
Pt would like rectal cream to be sent to [facility] pharmacy instead of [facility] pharmacy.

## 2020-04-01 DIAGNOSIS — F41.9 ANXIETY: Primary | ICD-10-CM

## 2020-04-02 RX ORDER — ALPRAZOLAM 0.5 MG/1
TABLET ORAL
Qty: 30 TABLET | Refills: 0 | Status: SHIPPED | OUTPATIENT
Start: 2020-04-02 | End: 2020-08-06 | Stop reason: SDUPTHER

## 2020-08-06 DIAGNOSIS — F41.9 ANXIETY: ICD-10-CM

## 2020-08-06 RX ORDER — ALPRAZOLAM 0.5 MG/1
TABLET ORAL
Qty: 30 TABLET | Refills: 0 | OUTPATIENT
Start: 2020-08-06

## 2020-08-06 RX ORDER — ALPRAZOLAM 0.5 MG/1
0.5 TABLET ORAL DAILY
Qty: 30 TABLET | Refills: 0 | Status: SHIPPED | OUTPATIENT
Start: 2020-08-06 | End: 2020-10-29

## 2020-08-06 NOTE — TELEPHONE ENCOUNTER
PATIENT CALLED FOR MED REFILL FOR     ALPRAZolam (XANAX) 0.5 MG tablet  HE HAS APPOINTMENT MADE ON 9/10/2020 FOR MED REFILL    HE HAS ONE LEFT    [facility]/pharmacy #[id number] - [place], [place] - [address] AT [facility] - [phone] [facility] [phone]   [phone]    PATIENT CALL BACK NUMBER [phone]

## 2020-09-10 ENCOUNTER — OFFICE VISIT (OUTPATIENT)
Dept: FAMILY MEDICINE CLINIC | Facility: CLINIC | Age: 70
End: 2020-09-10

## 2020-09-10 VITALS
SYSTOLIC BLOOD PRESSURE: 120 MMHG | OXYGEN SATURATION: 98 % | HEART RATE: 60 BPM | TEMPERATURE: 98 F | WEIGHT: 210 LBS | DIASTOLIC BLOOD PRESSURE: 82 MMHG | BODY MASS INDEX: 28.44 KG/M2 | HEIGHT: 72 IN

## 2020-09-10 DIAGNOSIS — E55.9 VITAMIN D DEFICIENCY: ICD-10-CM

## 2020-09-10 DIAGNOSIS — N40.1 BENIGN PROSTATIC HYPERPLASIA WITH URINARY OBSTRUCTION: ICD-10-CM

## 2020-09-10 DIAGNOSIS — F90.0 ATTENTION DEFICIT HYPERACTIVITY DISORDER (ADHD), PREDOMINANTLY INATTENTIVE TYPE: ICD-10-CM

## 2020-09-10 DIAGNOSIS — I10 HTN (HYPERTENSION), BENIGN: Primary | ICD-10-CM

## 2020-09-10 DIAGNOSIS — F51.04 PSYCHOPHYSIOLOGICAL INSOMNIA: ICD-10-CM

## 2020-09-10 DIAGNOSIS — E78.2 MIXED HYPERLIPIDEMIA: ICD-10-CM

## 2020-09-10 DIAGNOSIS — N13.8 BENIGN PROSTATIC HYPERPLASIA WITH URINARY OBSTRUCTION: ICD-10-CM

## 2020-09-10 PROCEDURE — 99213 OFFICE O/P EST LOW 20 MIN: CPT | Performed by: FAMILY MEDICINE

## 2020-09-10 RX ORDER — CANDESARTAN 32 MG/1
32 TABLET ORAL DAILY
Qty: 90 TABLET | Refills: 1 | Status: SHIPPED | OUTPATIENT
Start: 2020-09-10

## 2020-09-10 NOTE — PROGRESS NOTES
Subjective   Jay Jay Schreiber is a 69 y.o. male with   Chief Complaint   Patient presents with   • Hypertension   .    History of Present Illness   69-year-old white male with history of essential hypertension here for further medical management.  Patient has been using candesartan at 32 mg daily with success.  He has been on this product for many years and states that it has been effective and he has seen no side effects.  He will need refill of same.  Last labs were in December 2019 with a scheduled upcoming physical within the next month.  Patient uses alprazolam on a sparing basis and also uses Adderall at 20 mg on a very sparing basis.  Cialis is used daily for BPH which has been successful.  He is otherwise doing well and has no acute complaints.  The following portions of the patient's history were reviewed and updated as appropriate: allergies, current medications, past family history, past medical history, past social history, past surgical history and problem list.    Review of Systems   Cardiovascular:        Essential hypertension, hyperlipidemia   Genitourinary:        BPH with outflow obstructions, ED   Psychiatric/Behavioral: Positive for decreased concentration. The patient is nervous/anxious.        Objective     Vitals:    09/10/20 0815   BP: 120/82   Pulse: 60   Temp: 98 °F (36.7 °C)   SpO2: 98%       No results found for this or any previous visit (from the past 672 hour(s)).    Physical Exam   Constitutional: He is oriented to person, place, and time. He appears well-developed and well-nourished.   HENT:   Head: Normocephalic and atraumatic.   Neck: Trachea normal and phonation normal. Neck supple. Normal carotid pulses present. Carotid bruit is not present. No thyroid mass and no thyromegaly present.   Cardiovascular: Normal rate, regular rhythm and normal heart sounds. Exam reveals no gallop and no friction rub.   No murmur heard.  Pulmonary/Chest: Effort normal and breath sounds normal. No  respiratory distress. He has no decreased breath sounds. He has no wheezes. He has no rhonchi. He has no rales.   Lymphadenopathy:     He has no cervical adenopathy.   Neurological: He is alert and oriented to person, place, and time.   Skin: Skin is warm and dry. No rash noted.   Psychiatric: He has a normal mood and affect. His speech is normal and behavior is normal. Judgment and thought content normal. Cognition and memory are normal.   Nursing note and vitals reviewed.      Assessment/Plan   Jay Jay was seen today for hypertension.    Diagnoses and all orders for this visit:    HTN (hypertension), benign  -     candesartan (ATACAND) 32 MG tablet; Take 1 tablet by mouth Daily.  -     CBC w AUTO Differential; Future  -     Lipid panel; Future  -     Comprehensive metabolic panel; Future  -     PSA DIAGNOSTIC ONLY; Future  -     TSH; Future  -     Vitamin D 25 hydroxy; Future    Mixed hyperlipidemia  -     CBC w AUTO Differential; Future  -     Lipid panel; Future  -     Comprehensive metabolic panel; Future  -     PSA DIAGNOSTIC ONLY; Future  -     TSH; Future  -     Vitamin D 25 hydroxy; Future    Vitamin D deficiency  -     CBC w AUTO Differential; Future  -     Lipid panel; Future  -     Comprehensive metabolic panel; Future  -     PSA DIAGNOSTIC ONLY; Future  -     TSH; Future  -     Vitamin D 25 hydroxy; Future    BPH (benign prostatic hypertrophy) with urinary obstruction  -     CBC w AUTO Differential; Future  -     Lipid panel; Future  -     Comprehensive metabolic panel; Future  -     PSA DIAGNOSTIC ONLY; Future  -     TSH; Future  -     Vitamin D 25 hydroxy; Future    Attention deficit hyperactivity disorder (ADHD), predominantly inattentive type  -     CBC w AUTO Differential; Future  -     Lipid panel; Future  -     Comprehensive metabolic panel; Future  -     PSA DIAGNOSTIC ONLY; Future  -     TSH; Future  -     Vitamin D 25 hydroxy; Future    Psychophysiological insomnia  -     CBC w AUTO Differential;  Future  -     Lipid panel; Future  -     Comprehensive metabolic panel; Future  -     PSA DIAGNOSTIC ONLY; Future  -     TSH; Future  -     Vitamin D 25 hydroxy; Future        Return for Next scheduled follow up.

## 2020-10-06 DIAGNOSIS — E78.2 MIXED HYPERLIPIDEMIA: ICD-10-CM

## 2020-10-06 DIAGNOSIS — N40.1 BENIGN PROSTATIC HYPERPLASIA WITH URINARY OBSTRUCTION: ICD-10-CM

## 2020-10-06 DIAGNOSIS — N13.8 BENIGN PROSTATIC HYPERPLASIA WITH URINARY OBSTRUCTION: ICD-10-CM

## 2020-10-06 DIAGNOSIS — F51.04 PSYCHOPHYSIOLOGICAL INSOMNIA: ICD-10-CM

## 2020-10-06 DIAGNOSIS — F90.0 ATTENTION DEFICIT HYPERACTIVITY DISORDER (ADHD), PREDOMINANTLY INATTENTIVE TYPE: ICD-10-CM

## 2020-10-06 DIAGNOSIS — E55.9 VITAMIN D DEFICIENCY: ICD-10-CM

## 2020-10-06 DIAGNOSIS — Z79.899 HIGH RISK MEDICATION USE: Primary | ICD-10-CM

## 2020-10-06 DIAGNOSIS — I10 HTN (HYPERTENSION), BENIGN: ICD-10-CM

## 2020-10-07 LAB
25(OH)D3+25(OH)D2 SERPL-MCNC: 29.5 NG/ML (ref 30–100)
ALBUMIN SERPL-MCNC: 4.9 G/DL (ref 3.5–5.2)
ALBUMIN/GLOB SERPL: 2.7 G/DL
ALP SERPL-CCNC: 76 U/L (ref 39–117)
ALT SERPL-CCNC: 20 U/L (ref 1–41)
AST SERPL-CCNC: 19 U/L (ref 1–40)
BASOPHILS # BLD AUTO: 0.03 10*3/MM3 (ref 0–0.2)
BASOPHILS NFR BLD AUTO: 0.7 % (ref 0–1.5)
BILIRUB SERPL-MCNC: 0.3 MG/DL (ref 0–1.2)
BUN SERPL-MCNC: 13 MG/DL (ref 8–23)
BUN/CREAT SERPL: 13.3 (ref 7–25)
CALCIUM SERPL-MCNC: 8.9 MG/DL (ref 8.6–10.5)
CHLORIDE SERPL-SCNC: 106 MMOL/L (ref 98–107)
CHOLEST SERPL-MCNC: 262 MG/DL (ref 0–200)
CO2 SERPL-SCNC: 25 MMOL/L (ref 22–29)
CREAT SERPL-MCNC: 0.98 MG/DL (ref 0.76–1.27)
EOSINOPHIL # BLD AUTO: 0.18 10*3/MM3 (ref 0–0.4)
EOSINOPHIL NFR BLD AUTO: 4.2 % (ref 0.3–6.2)
ERYTHROCYTE [DISTWIDTH] IN BLOOD BY AUTOMATED COUNT: 13.1 % (ref 12.3–15.4)
GLOBULIN SER CALC-MCNC: 1.8 GM/DL
GLUCOSE SERPL-MCNC: 95 MG/DL (ref 65–99)
HCT VFR BLD AUTO: 45.2 % (ref 37.5–51)
HDLC SERPL-MCNC: 45 MG/DL (ref 40–60)
HGB BLD-MCNC: 15.4 G/DL (ref 13–17.7)
IMM GRANULOCYTES # BLD AUTO: 0.01 10*3/MM3 (ref 0–0.05)
IMM GRANULOCYTES NFR BLD AUTO: 0.2 % (ref 0–0.5)
LDLC SERPL CALC-MCNC: 187 MG/DL (ref 0–100)
LYMPHOCYTES # BLD AUTO: 1.25 10*3/MM3 (ref 0.7–3.1)
LYMPHOCYTES NFR BLD AUTO: 29 % (ref 19.6–45.3)
MCH RBC QN AUTO: 31 PG (ref 26.6–33)
MCHC RBC AUTO-ENTMCNC: 34.1 G/DL (ref 31.5–35.7)
MCV RBC AUTO: 90.9 FL (ref 79–97)
MONOCYTES # BLD AUTO: 0.46 10*3/MM3 (ref 0.1–0.9)
MONOCYTES NFR BLD AUTO: 10.7 % (ref 5–12)
NEUTROPHILS # BLD AUTO: 2.38 10*3/MM3 (ref 1.7–7)
NEUTROPHILS NFR BLD AUTO: 55.2 % (ref 42.7–76)
NRBC BLD AUTO-RTO: 0 /100 WBC (ref 0–0.2)
PLATELET # BLD AUTO: 214 10*3/MM3 (ref 140–450)
POTASSIUM SERPL-SCNC: 4.2 MMOL/L (ref 3.5–5.2)
PROT SERPL-MCNC: 6.7 G/DL (ref 6–8.5)
PSA SERPL-MCNC: 3.61 NG/ML (ref 0–4)
RBC # BLD AUTO: 4.97 10*6/MM3 (ref 4.14–5.8)
SODIUM SERPL-SCNC: 141 MMOL/L (ref 136–145)
TRIGL SERPL-MCNC: 149 MG/DL (ref 0–150)
TSH SERPL DL<=0.005 MIU/L-ACNC: 3.94 UIU/ML (ref 0.27–4.2)
VLDLC SERPL CALC-MCNC: 29.8 MG/DL
WBC # BLD AUTO: 4.31 10*3/MM3 (ref 3.4–10.8)

## 2020-10-07 NOTE — TELEPHONE ENCOUNTER
Pt asked us to send tadalafil to [facility] in [place] in the [facility] [place] center. The pharmacy's phone number is [phone].

## 2020-10-07 NOTE — TELEPHONE ENCOUNTER
Pt is going to use [employer] to help with cost of the Cialis, please send to the [facility] that is que'd.

## 2020-10-08 RX ORDER — TADALAFIL 20 MG/1
20 TABLET ORAL DAILY PRN
Qty: 20 TABLET | Refills: 0 | Status: SHIPPED | OUTPATIENT
Start: 2020-10-08

## 2020-10-14 ENCOUNTER — OFFICE VISIT (OUTPATIENT)
Dept: FAMILY MEDICINE CLINIC | Facility: CLINIC | Age: 70
End: 2020-10-14

## 2020-10-14 VITALS
TEMPERATURE: 98 F | WEIGHT: 212 LBS | SYSTOLIC BLOOD PRESSURE: 130 MMHG | HEIGHT: 72 IN | DIASTOLIC BLOOD PRESSURE: 80 MMHG | OXYGEN SATURATION: 96 % | HEART RATE: 80 BPM | BODY MASS INDEX: 28.71 KG/M2

## 2020-10-14 DIAGNOSIS — F90.0 ATTENTION DEFICIT HYPERACTIVITY DISORDER (ADHD), PREDOMINANTLY INATTENTIVE TYPE: ICD-10-CM

## 2020-10-14 DIAGNOSIS — I10 HTN (HYPERTENSION), BENIGN: ICD-10-CM

## 2020-10-14 DIAGNOSIS — Z00.00 MEDICARE ANNUAL WELLNESS VISIT, SUBSEQUENT: Primary | ICD-10-CM

## 2020-10-14 DIAGNOSIS — N40.1 BENIGN PROSTATIC HYPERPLASIA WITH URINARY OBSTRUCTION: ICD-10-CM

## 2020-10-14 DIAGNOSIS — F51.04 PSYCHOPHYSIOLOGICAL INSOMNIA: ICD-10-CM

## 2020-10-14 DIAGNOSIS — Z12.11 COLON CANCER SCREENING: ICD-10-CM

## 2020-10-14 DIAGNOSIS — N13.8 BENIGN PROSTATIC HYPERPLASIA WITH URINARY OBSTRUCTION: ICD-10-CM

## 2020-10-14 DIAGNOSIS — F41.9 ANXIETY: ICD-10-CM

## 2020-10-14 DIAGNOSIS — E78.2 MIXED HYPERLIPIDEMIA: ICD-10-CM

## 2020-10-14 DIAGNOSIS — E55.9 VITAMIN D DEFICIENCY: ICD-10-CM

## 2020-10-14 DIAGNOSIS — N52.9 VASCULOGENIC ERECTILE DYSFUNCTION, UNSPECIFIED VASCULOGENIC ERECTILE DYSFUNCTION TYPE: ICD-10-CM

## 2020-10-14 PROCEDURE — G0439 PPPS, SUBSEQ VISIT: HCPCS | Performed by: FAMILY MEDICINE

## 2020-10-14 PROCEDURE — 99213 OFFICE O/P EST LOW 20 MIN: CPT | Performed by: FAMILY MEDICINE

## 2020-10-14 NOTE — PROGRESS NOTES
QUICK REFERENCE INFORMATION:  The ABCs of Providing the Annual Wellness Visit   CMS.[employer] Subsequent Wellness Visit      Subjective   History of Present Illness    Jay Jay Schreiber is a 69 y.o. male who presents for an Subsequent Wellness Visit. In addition, we addressed the following health issues: 69-year-old white male with subsequent wellness visit as well as follow-up in regards to hypertension, erectile dysfunction, generalized anxiety as well as ADHD-inattentive type.  Current medications include Atacand 32 mg daily-patient uses half a tablet per day.  He also uses Cialis at 20 mg 1 hour before the event.  Alprazolam is used on a very rare basis and patient uses Adderall at 20 mg on an as-needed basis for his work schedule.  Adderall is helpful in improving focus and helping him maintain concentration throughout his workday.  He is able to complete task in an orderly fashion and is less easily distracted.  All the above medications are used appropriately and there have been no side effects of any kind with the use of these products.  Fasting labs have been acquired prior to this visit.      PMH, PSH, SocHx, FamHx, Allergies, and Medications: Reviewed and updated in the Visit Navigator.     Outpatient Medications Prior to Visit   Medication Sig Dispense Refill   • ALPRAZolam (XANAX) 0.5 MG tablet Take 1 tablet by mouth Daily. 30 tablet 0   • amphetamine-dextroamphetamine (ADDERALL) 20 MG tablet Take 1 tablet by mouth Daily. 30 tablet 0   • candesartan (ATACAND) 32 MG tablet Take 1 tablet by mouth Daily. 90 tablet 1   • hydrocortisone-pramoxine (ANALPRAM HC) 2.5-1 % rectal cream Insert  into the rectum 3 (Three) Times a Day. 30 g 2   • tadalafil (Cialis) 20 MG tablet Take 1 tablet by mouth Daily As Needed (BPH). 20 tablet 0     No facility-administered medications prior to visit.        Patient Active Problem List   Diagnosis   • Psychophysiological insomnia   • BPH (benign prostatic  hypertrophy) with urinary obstruction   • Hemorrhoids   • Attention deficit hyperactivity disorder (ADHD)   • HTN (hypertension), benign   • Anxiety   • Mixed hyperlipidemia   • Vitamin D deficiency   • Vasculogenic erectile dysfunction       Health Habits:  Dental Exam. up to date  Eye Exam. up to date  Exercise: 10 times/week.  Current exercise activities include: not asked    Social:  See review in SnapShot activity and in SocHx section of Visit Navigator.    Health Risk Assessment:  The patient has completed a Health Risk Assessment. This has been reviewed with them and has been scanned into [employer] as a separate document.    Current Medical Providers:  Patient Care Team:  Cheng Duran DO as PCP - General (Family Medicine)  Cheng Duran DO as PCP - Claims Attributed    The [facility] providers who are involved in the care of this patient are listed above. Additional providers and suppliers are listed below:  none    Recent Hospitalizations:  No recent hospitalization(s)..    Age-appropriate Screening Schedule:  Refer to the list below for future screening recommendations based on patient's age. Orders for these recommended tests are listed in the plan section. The patient has been provided with a written plan.    Health Maintenance   Topic Date Due   • COLONOSCOPY  1950   • TDAP/TD VACCINES (1 - Tdap) 10/17/1969   • ZOSTER VACCINE (1 of 2) 10/17/2000   • Pneumococcal Vaccine 65+ (1 of 1 - PPSV23) 10/17/2015   • HEPATITIS C SCREENING  01/25/2016   • ANNUAL WELLNESS VISIT  01/25/2016   • INFLUENZA VACCINE  08/01/2020   • LIPID PANEL  10/07/2021       Depression Screen:   PHQ-2/PHQ-9 Depression Screening 10/14/2020   Little interest or pleasure in doing things 0   Feeling down, depressed, or hopeless 0   Total Score 0       Functional and Cognitive Screening:  Functional & Cognitive Status 10/14/2020   Do you have difficulty preparing food and eating? No   Do you have difficulty bathing yourself,  "getting dressed or grooming yourself? No   Do you have difficulty using the toilet? No   Do you have difficulty moving around from place to place? No   Do you have trouble with steps or getting out of a bed or a chair? No   Current Diet Well Balanced Diet   Dental Exam Up to date   Eye Exam Up to date   Exercise (times per week) 10 times per week   Current Exercise Activities Include Not Asked   Do you need help using the phone?  No   Are you deaf or do you have serious difficulty hearing?  No   Do you need help with transportation? No   Do you need help shopping? No   Do you need help preparing meals?  No   Do you need help with housework?  No   Do you need help with laundry? No   Do you need help taking your medications? No   Do you need help managing money? No   Do you ever drive or ride in a car without wearing a seat belt? No   Have you felt unusual stress, anger or loneliness in the last month? No   Who do you live with? Other   If you need help, do you have trouble finding someone available to you? No   Do you have difficulty concentrating, remembering or making decisions? No       Does the patient have evidence of cognitive impairment? No    Identification of Risk Factors:  Risk factors include: Advance Directive Discussion  Cardiovascular risk  Colon Cancer Screening  Dementia/Memory   Depression/Dysphoria  Diabetic Lab Screening   Fall Risk  Immunizations Discussed/Encouraged (specific immunizations; adacel Tdap, Influenza, Pneumococcal 23 and Shingrix )  Prostate Cancer Screening .    Review of Systems   Cardiovascular:        Hypertension   Genitourinary:        Erectile dysfunction   Psychiatric/Behavioral: Positive for decreased concentration and sleep disturbance. The patient is nervous/anxious.        Pertinent items are noted in HPI.    Objective     Vitals:    10/14/20 0835   BP: 130/80   Pulse: 80   Temp: 98 °F (36.7 °C)   SpO2: 96%   Weight: 96.2 kg (212 lb)   Height: 182.9 cm (72\")   Physical " Exam  Vitals signs and nursing note reviewed.   Constitutional:       Appearance: He is well-developed.   HENT:      Head: Normocephalic and atraumatic.   Neck:      Musculoskeletal: Neck supple.      Thyroid: No thyroid mass or thyromegaly.      Vascular: Normal carotid pulses. No carotid bruit.      Trachea: Trachea and phonation normal.   Cardiovascular:      Rate and Rhythm: Normal rate and regular rhythm.      Heart sounds: Normal heart sounds. No murmur. No friction rub. No gallop.    Pulmonary:      Effort: Pulmonary effort is normal. No respiratory distress.      Breath sounds: Normal breath sounds. No decreased breath sounds, wheezing, rhonchi or rales.   Lymphadenopathy:      Cervical: No cervical adenopathy.   Skin:     General: Skin is warm and dry.      Findings: No rash.   Neurological:      Mental Status: He is alert and oriented to person, place, and time.   Psychiatric:         Speech: Speech normal.         Behavior: Behavior normal.         Thought Content: Thought content normal.         Judgment: Judgment normal.       Orders Only on 10/06/2020   Component Date Value Ref Range Status   • 25 Hydroxy, Vitamin D 10/07/2020 29.5* 30.0 - 100.0 ng/ml Final    Comment: Results may be falsely increased if patient taking Biotin.  Reference Range for Total Vitamin D 25(OH)  Deficiency <20.0 ng/mL  Insufficiency 21-29 ng/mL  Sufficiency  ng/mL  Toxicity >100 ng/ml     • TSH 10/07/2020 3.940  0.270 - 4.200 uIU/mL Final   • PSA 10/07/2020 3.610  0.000 - 4.000 ng/mL Final    Results may be falsely decreased if patient taking Biotin.   • Glucose 10/07/2020 95  65 - 99 mg/dL Final   • BUN 10/07/2020 13  8 - 23 mg/dL Final   • Creatinine 10/07/2020 0.98  0.76 - 1.27 mg/dL Final   • eGFR Non African Am 10/07/2020 76  >60 mL/min/1.73 Final   • eGFR African Am 10/07/2020 92  >60 mL/min/1.73 Final   • BUN/Creatinine Ratio 10/07/2020 13.3  7.0 - 25.0 Final   • Sodium 10/07/2020 141  136 - 145 mmol/L Final   •  Potassium 10/07/2020 4.2  3.5 - 5.2 mmol/L Final    Comment: Slight hemolysis detected by analyzer. Results may be  affected.     • Chloride 10/07/2020 106  98 - 107 mmol/L Final   • Total CO2 10/07/2020 25.0  22.0 - 29.0 mmol/L Final   • Calcium 10/07/2020 8.9  8.6 - 10.5 mg/dL Final   • Total Protein 10/07/2020 6.7  6.0 - 8.5 g/dL Final   • Albumin 10/07/2020 4.90  3.50 - 5.20 g/dL Final   • Globulin 10/07/2020 1.8  gm/dL Final   • A/G Ratio 10/07/2020 2.7  g/dL Final   • Total Bilirubin 10/07/2020 0.3  0.0 - 1.2 mg/dL Final   • Alkaline Phosphatase 10/07/2020 76  39 - 117 U/L Final   • AST (SGOT) 10/07/2020 19  1 - 40 U/L Final   • ALT (SGPT) 10/07/2020 20  1 - 41 U/L Final   • Total Cholesterol 10/07/2020 262* 0 - 200 mg/dL Final   • Triglycerides 10/07/2020 149  0 - 150 mg/dL Final   • HDL Cholesterol 10/07/2020 45  40 - 60 mg/dL Final   • VLDL Cholesterol [place] 10/07/2020 29.8  mg/dL Final   • LDL Chol Calc (NIH) 10/07/2020 187* 0 - 100 mg/dL Final   • WBC 10/07/2020 4.31  3.40 - 10.80 10*3/mm3 Final   • RBC 10/07/2020 4.97  4.14 - 5.80 10*6/mm3 Final   • Hemoglobin 10/07/2020 15.4  13.0 - 17.7 g/dL Final   • Hematocrit 10/07/2020 45.2  37.5 - 51.0 % Final   • MCV 10/07/2020 90.9  79.0 - 97.0 fL Final   • MCH 10/07/2020 31.0  26.6 - 33.0 pg Final   • MCHC 10/07/2020 34.1  31.5 - 35.7 g/dL Final   • RDW 10/07/2020 13.1  12.3 - 15.4 % Final   • Platelets 10/07/2020 214  140 - 450 10*3/mm3 Final   • Neutrophil Rel % 10/07/2020 55.2  42.7 - 76.0 % Final   • Lymphocyte Rel % 10/07/2020 29.0  19.6 - 45.3 % Final   • Monocyte Rel % 10/07/2020 10.7  5.0 - 12.0 % Final   • Eosinophil Rel % 10/07/2020 4.2  0.3 - 6.2 % Final   • Basophil Rel % 10/07/2020 0.7  0.0 - 1.5 % Final   • Neutrophils Absolute 10/07/2020 2.38  1.70 - 7.00 10*3/mm3 Final   • Lymphocytes Absolute 10/07/2020 1.25  0.70 - 3.10 10*3/mm3 Final   • Monocytes Absolute 10/07/2020 0.46  0.10 - 0.90 10*3/mm3 Final   • Eosinophils Absolute 10/07/2020 0.18   0.00 - 0.40 10*3/mm3 Final   • Basophils Absolute 10/07/2020 0.03  0.00 - 0.20 10*3/mm3 Final   • Immature Granulocyte Rel % 10/07/2020 0.2  0.0 - 0.5 % Final   • Immature Grans Absolute 10/07/2020 0.01  0.00 - 0.05 10*3/mm3 Final   • nRBC 10/07/2020 0.0  0.0 - 0.2 /100 WBC Final         Body mass index is 28.75 kg/m².    Assessment/Plan   Patient Self-Management and Personalized Health Advice  The patient has been provided with information about: diet, exercise, weight management and prevention of cardiac or vascular disease and preventive services including:   · Annual Wellness Visit (AWV)  · Cardiovascular Disease Screening Tests (may do this order every 5 years in beneficiaries without signs or symptoms of cardiovascular disease)  · Colorectal Cancer Screening, Cologuard Test   · Diabetes Screening-Lab Order for either glucose quantitative blood (except reagent strip), glucose;post glucose dose(includes glucose), or glucose tolerance test-3 specimens(includes glucose)  · Influenza Vaccine and Administration  · Pneumococcal Vaccine and Administration  · Prostate Cancer Screening .    Discussed the patient's BMI with him. The BMI is in the acceptable range.    Orders:  Orders Placed This Encounter   Procedures   • Cologuard - Stool, Per Rectum   • Lipid panel   • Comprehensive metabolic panel   • TSH   • Vitamin D 25 hydroxy   • CBC w AUTO Differential   Patient was counseled on a low-cholesterol diet and dietary handout was given.  Recheck of fasting labs will take place in the spring and if cholesterol is not to goal medications will be discussed.    Follow Up:  Return in about 6 months (around 4/14/2021) for Recheck.     An After Visit Summary and PPPS with all of these plans were given to the patient.

## 2020-10-29 DIAGNOSIS — F90.0 ATTENTION DEFICIT HYPERACTIVITY DISORDER (ADHD), PREDOMINANTLY INATTENTIVE TYPE: ICD-10-CM

## 2020-10-29 DIAGNOSIS — F41.9 ANXIETY: ICD-10-CM

## 2020-10-29 RX ORDER — ALPRAZOLAM 0.5 MG/1
TABLET ORAL
Qty: 30 TABLET | Refills: 0 | Status: SHIPPED | OUTPATIENT
Start: 2020-10-29

## 2020-10-29 NOTE — TELEPHONE ENCOUNTER
Patient called in requesting a re-fill for    amphetamine-dextroamphetamine (ADDERALL) 20 MG tablet    [id number] [facility]    Best call back # [phone]

## 2020-10-30 RX ORDER — DEXTROAMPHETAMINE SACCHARATE, AMPHETAMINE ASPARTATE, DEXTROAMPHETAMINE SULFATE AND AMPHETAMINE SULFATE 5; 5; 5; 5 MG/1; MG/1; MG/1; MG/1
20 TABLET ORAL DAILY
Qty: 30 TABLET | Refills: 0 | Status: SHIPPED | OUTPATIENT
Start: 2020-10-30

## 2020-11-19 ENCOUNTER — TELEPHONE (OUTPATIENT)
Dept: FAMILY MEDICINE CLINIC | Facility: CLINIC | Age: 70
End: 2020-11-19

## 2020-11-19 NOTE — TELEPHONE ENCOUNTER
Patient is calling to check the status of Cologuard test.      Please advise.    Patient call back [phone].

## 2021-03-24 DIAGNOSIS — N52.9 VASCULOGENIC ERECTILE DYSFUNCTION, UNSPECIFIED VASCULOGENIC ERECTILE DYSFUNCTION TYPE: ICD-10-CM

## 2021-03-24 DIAGNOSIS — F51.04 PSYCHOPHYSIOLOGICAL INSOMNIA: ICD-10-CM

## 2021-03-24 DIAGNOSIS — N13.8 BENIGN PROSTATIC HYPERPLASIA WITH URINARY OBSTRUCTION: ICD-10-CM

## 2021-03-24 DIAGNOSIS — Z00.00 MEDICARE ANNUAL WELLNESS VISIT, SUBSEQUENT: ICD-10-CM

## 2021-03-24 DIAGNOSIS — Z12.11 COLON CANCER SCREENING: ICD-10-CM

## 2021-03-24 DIAGNOSIS — N40.1 BENIGN PROSTATIC HYPERPLASIA WITH URINARY OBSTRUCTION: ICD-10-CM

## 2021-03-24 DIAGNOSIS — F41.9 ANXIETY: ICD-10-CM

## 2021-03-24 DIAGNOSIS — E78.2 MIXED HYPERLIPIDEMIA: ICD-10-CM

## 2021-03-24 DIAGNOSIS — I10 HTN (HYPERTENSION), BENIGN: ICD-10-CM

## 2021-03-24 DIAGNOSIS — E55.9 VITAMIN D DEFICIENCY: ICD-10-CM

## 2021-03-24 DIAGNOSIS — F90.0 ATTENTION DEFICIT HYPERACTIVITY DISORDER (ADHD), PREDOMINANTLY INATTENTIVE TYPE: ICD-10-CM
